# Patient Record
(demographics unavailable — no encounter records)

---

## 2024-10-28 NOTE — PHYSICAL EXAM
[Well Developed] : well developed [Well Nourished] : well nourished [Normal S1, S2] : normal S1, S2 [Clear Lung Fields] : clear lung fields [Soft] : abdomen soft [Non Tender] : non-tender [Normal Gait] : normal gait [No Edema] : no edema [No Rash] : no rash [Moves all extremities] : moves all extremities [Alert and Oriented] : alert and oriented [de-identified] : ~6-8 JVP [id number]

## 2024-10-28 NOTE — HISTORY OF PRESENT ILLNESS
[FreeTextEntry1] : This is a 62-year-old man with past history of DM, HTN, HLD that presented to the emergency room from [facility] BAE[facility] MD office after having shortness of breath, dyspnea on exertion. NINA Baez MD discussed with David Carmona MD, The recommendation was for the patient to be evaluated in the emergency room. Initially he was admitted to the observation unit. The patient had a stress test done showing an abnormal stress test and his EF is 13% so the patient was admitted to the hospital for evaluation. The patient went on to have a LHC/RHC which showed All vessels are severely calcified LM: Mild diffuse disease LAD: Prox: Mild diffuse disease. Mid: 85% stenosis Distal: severe diffuse disease   D1: Large vessel 90% stenosis LCX: Large vessel. Moderate diffuse disease.  LPL1: Medium size, severe diffuse disease.  LPL2 and LPDA: moderate diffuse disease RCA: small size. Proximal   LVEDP:28mmHg and RA: 7mmHg, RV: 73/-1/14 mmHg PA: 76/31/48 mmHg PCWP: 31/32/28 CO/CI: 5.1/ 2.2. Pt also had a TTE which showed an LVEF 21%  LVSF is severely decreased, severe grade 3 LVDD, moderately enlarged RV and moderately to severely reduced RVSF No pericardial effusion seen. PASP 59 mmHg. Pt has been started on GDMT and transferred to [place] and received and Impella assisted Rota/ cutting balloon/ZACH x 3 p/m/d LAD RFA PC x 2 of note, EF 20%, EDP 27.  Pt returns for his follow-up appointment since 7/13/24 states he feels well. He reports no change in appetite. He denies CP, palpitations, syncope, LH/dizziness, orthopnea, PND, abdominal discomfort, and LE edema. He has been limiting fluid and sodium in his diet and taking his medications as directed. He does not use NSAIDs.

## 2024-10-28 NOTE — PHYSICAL EXAM
[Well Developed] : well developed [Well Nourished] : well nourished [Normal S1, S2] : normal S1, S2 [Clear Lung Fields] : clear lung fields [Soft] : abdomen soft [Non Tender] : non-tender [Normal Gait] : normal gait [No Edema] : no edema [No Rash] : no rash [Moves all extremities] : moves all extremities [Alert and Oriented] : alert and oriented [de-identified] : ~6-8 JVP [id number]

## 2024-10-28 NOTE — PHYSICAL EXAM
[Well Developed] : well developed [Well Nourished] : well nourished [Normal S1, S2] : normal S1, S2 [Clear Lung Fields] : clear lung fields [Soft] : abdomen soft [Non Tender] : non-tender [Normal Gait] : normal gait [No Edema] : no edema [No Rash] : no rash [Moves all extremities] : moves all extremities [Alert and Oriented] : alert and oriented [de-identified] : ~6-8 JVP [id number]

## 2024-10-28 NOTE — HISTORY OF PRESENT ILLNESS
[FreeTextEntry1] : This is a 62-year-old man with past history of DM, HTN, HLD that presented to the emergency room from [facility] office after having shortness of breath, dyspnea on exertion. NINA Baez MD discussed with David Carmona MD, The recommendation was for the patient to be evaluated in the emergency room. Initially he was admitted to the observation unit. The patient had a stress test done showing an abnormal stress test and his EF is 13% so the patient was admitted to the hospital for evaluation. The patient went on to have a LHC/RHC which showed All vessels are severely calcified LM: Mild diffuse disease LAD: Prox: Mild diffuse disease. Mid: 85% stenosis Distal: severe diffuse disease   D1: Large vessel 90% stenosis LCX: Large vessel. Moderate diffuse disease.  LPL1: Medium size, severe diffuse disease.  LPL2 and LPDA: moderate diffuse disease RCA: small size. Proximal   LVEDP:28mmHg and RA: 7mmHg, RV: 73/-1/14 mmHg PA: 76/31/48 mmHg PCWP: 31/32/28 CO/CI: 5.1/ 2.2. Pt also had a TTE which showed an LVEF 21%  LVSF is severely decreased, severe grade 3 LVDD, moderately enlarged RV and moderately to severely reduced RVSF No pericardial effusion seen. PASP 59 mmHg. Pt has been started on GDMT and transferred to [place] and received and Impella assisted Rota/ cutting balloon/ZACH x 3 p/m/d LAD RFA PC x 2 of note, EF 20%, EDP 27.  Pt returns for his follow-up appointment since 7/13/24 states he feels well. He reports no change in appetite. He denies CP, palpitations, syncope, LH/dizziness, orthopnea, PND, abdominal discomfort, and LE edema. He has been limiting fluid and sodium in his diet and taking his medications as directed. He does not use NSAIDs.

## 2024-10-28 NOTE — ASSESSMENT
[FreeTextEntry1] : This is a 62-year-old man with past history of DM, HTN, HLD that presented to the emergency room from [facility] office after having shortness of breath, dyspnea on exertion. NINA Baez MD discussed with David Carmona MD, The recommendation was for the patient to be evaluated in the emergency room. Initially he was admitted to the observation unit. The patient had a stress test done showing an abnormal stress test and his EF is 13% so the patient was admitted to the hospital for evaluation. The patient went on to have a LHC/RHC which showed All vessels are severely calcified LM: Mild diffuse disease LAD: Prox: Mild diffuse disease. Mid: 85% stenosis Distal: severe diffuse disease   D1: Large vessel 90% stenosis LCX: Large vessel. Moderate diffuse disease.  LPL1: Medium size, severe diffuse disease.  LPL2 and LPDA: moderate diffuse disease RCA: small size. Proximal   LVEDP:28mmHg and RA: 7mmHg, RV: 73/-1/14 mmHg PA: 76/31/48 mmHg PCWP: 31/32/28 CO/CI: 5.1/ 2.2. Pt also had a TTE which showed an LVEF 21%  LVSF is severely decreased, severe grade 3 LVDD, moderately enlarged RV and moderately to severely reduced RVSF No pericardial effusion seen. PASP 59 mmHg. Pt has been started on GDMT and transferred to [place] and received and Impella assisted Rota/ cutting balloon/ZACH x 3 p/m/d LAD RFA PC x 2 of note, EF 20%, EDP 27.   HFrEF (heart failure with reduced ejection fraction)     - LVEF 21% on TTE and 13% on stress echo ---> Increased to 45% to now 51%  - Continue Entresto to  mg BID  - Continue Spironolactone 25 mg QD   - Continue Carvedilol 12.5 mg BID     - Continue Farxiga 10 mg QD  - Repeat Labs to be completed today after visit.  - continues to work out on his own. - Take your weight +BP daily and report weight gain of 3 lbs in one day or 5lbs in one week that is unrelieved by diuretic therapy. Notify office of HR <60 or BP systolic <90 for medication optimization. Limit your sodium intake and Keep fluids between 1.5-2L daily.  CAD (coronary artery disease)  s/p  Impella assisted PCI  - Recommend continuing Atorvastatin 20 mg QD   - Recommend Aspirin 81 mg QD       HTN (hypertension)      - Medication regimen as above    RTC in 6 months for Follow-up appointment with Dr. Cardona will need repeat TTE at that time (May obtain at Dr. Brunson office.)

## 2024-10-28 NOTE — ASSESSMENT
[FreeTextEntry1] : This is a 62-year-old man with past history of DM, HTN, HLD that presented to the emergency room from [facility] office after having shortness of breath, dyspnea on exertion. NINA Baez MD discussed with David Carmona MD, The recommendation was for the patient to be evaluated in the emergency room. Initially he was admitted to the observation unit. The patient had a stress test done showing an abnormal stress test and his EF is 13% so the patient was admitted to the hospital for evaluation. The patient went on to have a LHC/RHC which showed All vessels are severely calcified LM: Mild diffuse disease LAD: Prox: Mild diffuse disease. Mid: 85% stenosis Distal: severe diffuse disease   D1: Large vessel 90% stenosis LCX: Large vessel. Moderate diffuse disease.  LPL1: Medium size, severe diffuse disease.  LPL2 and LPDA: moderate diffuse disease RCA: small size. Proximal   LVEDP:28mmHg and RA: 7mmHg, RV: 73/-1/14 mmHg PA: 76/31/48 mmHg PCWP: 31/32/28 CO/CI: 5.1/ 2.2. Pt also had a TTE which showed an LVEF 21%  LVSF is severely decreased, severe grade 3 LVDD, moderately enlarged RV and moderately to severely reduced RVSF No pericardial effusion seen. PASP 59 mmHg. Pt has been started on GDMT and transferred to [place] and received and Impella assisted Rota/ cutting balloon/ZACH x 3 p/m/d LAD RFA PC x 2 of note, EF 20%, EDP 27.   HFrEF (heart failure with reduced ejection fraction)     - LVEF 21% on TTE and 13% on stress echo ---> Increased to 45% to now 51%  - Continue Entresto to  mg BID  - Continue Spironolactone 25 mg QD   - Continue Carvedilol 12.5 mg BID     - Continue Farxiga 10 mg QD  - Repeat Labs to be completed today after visit.  - continues to work out on his own. - Take your weight +BP daily and report weight gain of 3 lbs in one day or 5lbs in one week that is unrelieved by diuretic therapy. Notify office of HR <60 or BP systolic <90 for medication optimization. Limit your sodium intake and Keep fluids between 1.5-2L daily.  CAD (coronary artery disease)  s/p  Impella assisted PCI  - Recommend continuing Atorvastatin 20 mg QD   - Recommend Aspirin 81 mg QD       HTN (hypertension)      - Medication regimen as above    RTC in 6 months for Follow-up appointment with Dr. Cardona will need repeat TTE at that time (May obtain at Dr. Brunson office.)

## 2024-10-28 NOTE — HISTORY OF PRESENT ILLNESS
[FreeTextEntry1] : This is a 62-year-old man with past history of DM, HTN, HLD that presented to the emergency room from [facility] office after having shortness of breath, dyspnea on exertion. NINA Baez MD discussed with David Carmona MD, The recommendation was for the patient to be evaluated in the emergency room. Initially he was admitted to the observation unit. The patient had a stress test done showing an abnormal stress test and his EF is 13% so the patient was admitted to the hospital for evaluation. The patient went on to have a LHC/RHC which showed All vessels are severely calcified LM: Mild diffuse disease LAD: Prox: Mild diffuse disease. Mid: 85% stenosis Distal: severe diffuse disease   D1: Large vessel 90% stenosis LCX: Large vessel. Moderate diffuse disease.  LPL1: Medium size, severe diffuse disease.  LPL2 and LPDA: moderate diffuse disease RCA: small size. Proximal   LVEDP:28mmHg and RA: 7mmHg, RV: 73/-1/14 mmHg PA: 76/31/48 mmHg PCWP: 31/32/28 CO/CI: 5.1/ 2.2. Pt also had a TTE which showed an LVEF 21%  LVSF is severely decreased, severe grade 3 LVDD, moderately enlarged RV and moderately to severely reduced RVSF No pericardial effusion seen. PASP 59 mmHg. Pt has been started on GDMT and transferred to [place] and received and Impella assisted Rota/ cutting balloon/ZACH x 3 p/m/d LAD RFA PC x 2 of note, EF 20%, EDP 27.  Pt returns for his follow-up appointment since 7/13/24 states he feels well. He reports no change in appetite. He denies CP, palpitations, syncope, LH/dizziness, orthopnea, PND, abdominal discomfort, and LE edema. He has been limiting fluid and sodium in his diet and taking his medications as directed. He does not use NSAIDs.

## 2024-10-28 NOTE — CARDIOLOGY SUMMARY
[de-identified] : Stress test 10/20/23: Myocardial Perfusion: Abnormal. The left ventricle is severely reduced in function and severely enlarged in size. The resting left ventricular EF% is 13 %. Inferior and inferolateral hypokinesis. Patient's resting study showed severe diffuse hypokinesis with severe ischemia/infarct  in all the segments of the heart with normal perfusion in just the basal and mid septal wall. Due to this finding, stress study cancelled and Dr. Mcdonald made aware of above abnormal finding.     [de-identified] : TTE: LVIDd 4.7 cm LVEF 51%, RA is nml, LA is nml, RVSF, mild MR, mild TR, PASP 19.62 mmHG  Echo 10/20/23:  LVEF 21% LVSF is severely decreased, severe grade 3 LVDD, moderately enlarged RV and moderately to severely reduced RVSF No pericardial effusion seen. PASP 59 mmHg, The inferior vena cava is dilated measuring 2.14 cm in diameter, (dilated >2.1cm) with abnormal inspiratory collapse (abnormal <50%) consistent with elevated right atrial pressure ( R 15, range 10-20mmHg)    [de-identified] : Cardiac Cath  10/20/23: LHC/RHC which showed All vessels are severely calcified LM: Mild diffuse disease LAD: Prox: Mild diffuse disease. Mid: 85% stenosis  Distal: severe diffuse disease   D1: Large vessel 90% stenosis LCX: Large vessel. Moderate diffuse disease.  LPL1: Medium size, severe diffuse disease.  LPL2 and LPDA: moderate diffuse disease RCA: small size. Proximal   LVEDP:28mmHg and RA: 7mmHg, RV: 73/-1/14 mmHg PA: 76/31/48 mmHg PCWP: 31/32/28 CO/CI: 5.1/ 2.2.

## 2024-10-28 NOTE — CARDIOLOGY SUMMARY
[de-identified] : Stress test 10/20/23: Myocardial Perfusion: Abnormal. The left ventricle is severely reduced in function and severely enlarged in size. The resting left ventricular EF% is 13 %. Inferior and inferolateral hypokinesis. Patient's resting study showed severe diffuse hypokinesis with severe ischemia/infarct  in all the segments of the heart with normal perfusion in just the basal and mid septal wall. Due to this finding, stress study cancelled and Dr. Mcdonald made aware of above abnormal finding.     [de-identified] : TTE: LVIDd 4.7 cm LVEF 51%, RA is nml, LA is nml, RVSF, mild MR, mild TR, PASP 19.62 mmHG  Echo 10/20/23:  LVEF 21% LVSF is severely decreased, severe grade 3 LVDD, moderately enlarged RV and moderately to severely reduced RVSF No pericardial effusion seen. PASP 59 mmHg, The inferior vena cava is dilated measuring 2.14 cm in diameter, (dilated >2.1cm) with abnormal inspiratory collapse (abnormal <50%) consistent with elevated right atrial pressure ( R 15, range 10-20mmHg)    [de-identified] : Cardiac Cath  10/20/23: LHC/RHC which showed All vessels are severely calcified LM: Mild diffuse disease LAD: Prox: Mild diffuse disease. Mid: 85% stenosis  Distal: severe diffuse disease   D1: Large vessel 90% stenosis LCX: Large vessel. Moderate diffuse disease.  LPL1: Medium size, severe diffuse disease.  LPL2 and LPDA: moderate diffuse disease RCA: small size. Proximal   LVEDP:28mmHg and RA: 7mmHg, RV: 73/-1/14 mmHg PA: 76/31/48 mmHg PCWP: 31/32/28 CO/CI: 5.1/ 2.2.

## 2024-10-28 NOTE — CARDIOLOGY SUMMARY
[de-identified] : Stress test 10/20/23: Myocardial Perfusion: Abnormal. The left ventricle is severely reduced in function and severely enlarged in size. The resting left ventricular EF% is 13 %. Inferior and inferolateral hypokinesis. Patient's resting study showed severe diffuse hypokinesis with severe ischemia/infarct  in all the segments of the heart with normal perfusion in just the basal and mid septal wall. Due to this finding, stress study cancelled and Dr. Mcdonald made aware of above abnormal finding.     [de-identified] : TTE: LVIDd 4.7 cm LVEF 51%, RA is nml, LA is nml, RVSF, mild MR, mild TR, PASP 19.62 mmHG  Echo 10/20/23:  LVEF 21% LVSF is severely decreased, severe grade 3 LVDD, moderately enlarged RV and moderately to severely reduced RVSF No pericardial effusion seen. PASP 59 mmHg, The inferior vena cava is dilated measuring 2.14 cm in diameter, (dilated >2.1cm) with abnormal inspiratory collapse (abnormal <50%) consistent with elevated right atrial pressure ( R 15, range 10-20mmHg)    [de-identified] : Cardiac Cath  10/20/23: LHC/RHC which showed All vessels are severely calcified LM: Mild diffuse disease LAD: Prox: Mild diffuse disease. Mid: 85% stenosis  Distal: severe diffuse disease   D1: Large vessel 90% stenosis LCX: Large vessel. Moderate diffuse disease.  LPL1: Medium size, severe diffuse disease.  LPL2 and LPDA: moderate diffuse disease RCA: small size. Proximal   LVEDP:28mmHg and RA: 7mmHg, RV: 73/-1/14 mmHg PA: 76/31/48 mmHg PCWP: 31/32/28 CO/CI: 5.1/ 2.2.   Dressing (No Sutures): pressure dressing

## 2025-03-03 NOTE — HISTORY OF PRESENT ILLNESS
[FreeTextEntry1] : This is a 62-year-old man with past history of DM, HTN, HLD that presented to the emergency room from [facility] office after having shortness of breath, dyspnea on exertion. NINA Baez MD discussed with David Carmona MD, The recommendation was for the patient to be evaluated in the emergency room. Initially he was admitted to the observation unit. The patient had a stress test done showing an abnormal stress test and his EF is 13% so the patient was admitted to the hospital for evaluation. The patient went on to have a LHC/RHC which showed All vessels are severely calcified LM: Mild diffuse disease LAD: Prox: Mild diffuse disease. Mid: 85% stenosis Distal: severe diffuse disease   D1: Large vessel 90% stenosis LCX: Large vessel. Moderate diffuse disease.  LPL1: Medium size, severe diffuse disease.  LPL2 and LPDA: moderate diffuse disease RCA: small size. Proximal   LVEDP:28mmHg and RA: 7mmHg, RV: 73/-1/14 mmHg PA: 76/31/48 mmHg PCWP: 31/32/28 CO/CI: 5.1/ 2.2. Pt also had a TTE which showed an LVEF 21%  LVSF is severely decreased, severe grade 3 LVDD, moderately enlarged RV and moderately to severely reduced RVSF No pericardial effusion seen. PASP 59 mmHg. Pt has been started on GDMT and transferred to [place] and received and Impella assisted Rota/ cutting balloon/ZACH x 3 p/m/d LAD RFA PC x 2 of note, EF 20%, EDP 27.  Pt returns for his follow-up appointment since 7/13/24 states he feels well. He reports no change in appetite. He denies CP, palpitations, syncope, LH/dizziness, orthopnea, PND, abdominal discomfort, and LE edema. He has been limiting fluid and sodium in his diet and taking his medications as directed. He does not use NSAIDs.

## 2025-03-03 NOTE — CARDIOLOGY SUMMARY
[de-identified] : Stress test 10/20/23: Myocardial Perfusion: Abnormal. The left ventricle is severely reduced in function and severely enlarged in size. The resting left ventricular EF% is 13 %. Inferior and inferolateral hypokinesis. Patient's resting study showed severe diffuse hypokinesis with severe ischemia/infarct  in all the segments of the heart with normal perfusion in just the basal and mid septal wall. Due to this finding, stress study cancelled and Dr. Mcdonald made aware of above abnormal finding.     [de-identified] : TTE: LVIDd 4.7 cm LVEF 51%, RA is nml, LA is nml, RVSF, mild MR, mild TR, PASP 19.62 mmHG  Echo 10/20/23:  LVEF 21% LVSF is severely decreased, severe grade 3 LVDD, moderately enlarged RV and moderately to severely reduced RVSF No pericardial effusion seen. PASP 59 mmHg, The inferior vena cava is dilated measuring 2.14 cm in diameter, (dilated >2.1cm) with abnormal inspiratory collapse (abnormal <50%) consistent with elevated right atrial pressure ( R 15, range 10-20mmHg)    [de-identified] : Cardiac Cath  10/20/23: LHC/RHC which showed All vessels are severely calcified LM: Mild diffuse disease LAD: Prox: Mild diffuse disease. Mid: 85% stenosis  Distal: severe diffuse disease   D1: Large vessel 90% stenosis LCX: Large vessel. Moderate diffuse disease.  LPL1: Medium size, severe diffuse disease.  LPL2 and LPDA: moderate diffuse disease RCA: small size. Proximal   LVEDP:28mmHg and RA: 7mmHg, RV: 73/-1/14 mmHg PA: 76/31/48 mmHg PCWP: 31/32/28 CO/CI: 5.1/ 2.2.

## 2025-03-03 NOTE — PHYSICAL EXAM
[Well Developed] : well developed [Well Nourished] : well nourished [Normal S1, S2] : normal S1, S2 [Clear Lung Fields] : clear lung fields [Soft] : abdomen soft [Non Tender] : non-tender [Normal Gait] : normal gait [No Edema] : no edema [No Rash] : no rash [Moves all extremities] : moves all extremities [Alert and Oriented] : alert and oriented [de-identified] : ~6-8 JVP [id number]

## 2025-03-03 NOTE — ASSESSMENT
[FreeTextEntry1] : This is a 62-year-old man with past history of DM, HTN, HLD that presented to the emergency room from [facility] office after having shortness of breath, dyspnea on exertion. NINA Baez MD discussed with David Carmona MD, The recommendation was for the patient to be evaluated in the emergency room. Initially he was admitted to the observation unit. The patient had a stress test done showing an abnormal stress test and his EF is 13% so the patient was admitted to the hospital for evaluation. The patient went on to have a LHC/RHC which showed All vessels are severely calcified LM: Mild diffuse disease LAD: Prox: Mild diffuse disease. Mid: 85% stenosis Distal: severe diffuse disease   D1: Large vessel 90% stenosis LCX: Large vessel. Moderate diffuse disease.  LPL1: Medium size, severe diffuse disease.  LPL2 and LPDA: moderate diffuse disease RCA: small size. Proximal   LVEDP:28mmHg and RA: 7mmHg, RV: 73/-1/14 mmHg PA: 76/31/48 mmHg PCWP: 31/32/28 CO/CI: 5.1/ 2.2. Pt also had a TTE which showed an LVEF 21%  LVSF is severely decreased, severe grade 3 LVDD, moderately enlarged RV and moderately to severely reduced RVSF No pericardial effusion seen. PASP 59 mmHg. Pt has been started on GDMT and transferred to [place] and received and Impella assisted Rota/ cutting balloon/ZACH x 3 p/m/d LAD RFA PC x 2 of note, EF 20%, EDP 27.   HFimpEF,    - EF improved 25%--> 50-55% - Continue Entresto to  mg BID  - Continue Spironolactone 25 mg QD   - Continue Carvedilol 12.5 mg BID     - Continue Farxiga 10 mg QD  - pt seeing his PMD next week, so will get labs with him - continues to work out on his own. - Take your weight +BP daily and report weight gain of 3 lbs in one day or 5lbs in one week that is unrelieved by diuretic therapy. Notify office of HR <60 or BP systolic <90 for medication optimization. Limit your sodium intake and Keep fluids between 1.5-2L daily.  CAD (coronary artery disease)  s/p  Impella assisted PCI  -cont antiplatelets and statin  HTN (hypertension)      - Medication regimen as above   - home BP ranges , once in a while its in 140's but not often  cont follow up with Dr. Carmona and will get TTE on his follow up HF follow up in 1 year